# Patient Record
Sex: FEMALE | Race: WHITE | NOT HISPANIC OR LATINO | Employment: FULL TIME | URBAN - METROPOLITAN AREA
[De-identification: names, ages, dates, MRNs, and addresses within clinical notes are randomized per-mention and may not be internally consistent; named-entity substitution may affect disease eponyms.]

---

## 2018-11-28 ENCOUNTER — TELEPHONE (OUTPATIENT)
Dept: PHARMACY | Facility: CLINIC | Age: 63
End: 2018-11-28

## 2018-11-28 NOTE — TELEPHONE ENCOUNTER
Prior authorization for Repatha is already on file.    Approval dates:  11/13/2018 - 11/12/2019    Patient co-pay:  $250.00    Repatha co-pay card on file which reduces co-pay to $5.00.

## 2018-12-06 NOTE — TELEPHONE ENCOUNTER
Initial Repatha 140mg/mL Sureclick PENS consultation attempted.  Patient had asked to call her at work for 3pm - she was gone for the day.  Home # tried.  No answer.  No option to [facility] for call back.  $5 copay in 004.

## 2018-12-07 NOTE — TELEPHONE ENCOUNTER
"Incoming call for Repatha. Confirmed two patient identifiers - name + . Pt states that she was on Repatha "years ago" but due to insurance coverage issues, she has been off of it for quite some time. She declines consultation and injection training, but she understands she can call [facility] if she has further questions or concerns. She confirms dosing of Q14D. She asks [facility] to ship Repatha on 12/10/18 to arrive at her home on 18 via [employer]. $5 copay (CCOF), Address confirmed - NO signature requirement requested, NOTE to [employer]: put on handicap ramp. Patient will restart Repatha on 18 when received. [facility] will f/u a week after and then monthly for refills. TTN  "

## 2018-12-26 NOTE — TELEPHONE ENCOUNTER
2nd attempt for Repatha F/U.  No answer.   No option to [facility] for call back.

## 2019-01-04 ENCOUNTER — TELEPHONE (OUTPATIENT)
Dept: PHARMACY | Facility: CLINIC | Age: 64
End: 2019-01-04

## 2019-01-04 NOTE — TELEPHONE ENCOUNTER
Repatha Sureclick follow up refill confirmed. We will ship Repatha refill on 19 via fedex to arrive on 19. $5.00 copay- 004. Confirmed 2 patient identifiers - name and .      Start date confirmed 18.  Patient self injects Repatha in her stomach without any difficulties. Confirms rotating injection site and storing med in the fridge.  She has zero doses on hand, next injection due 19.  Patient reports no side effects since beginning of therapy.  No missed doses, no new medications, no new allergies or health conditions reported at this time. All questions answered and addressed to patients satisfaction. Pt verbalized understanding.

## 2019-01-29 NOTE — TELEPHONE ENCOUNTER
Patient called to refill Repatha.  Patient confirmed she is due for next injection 2/5/19.  Ship 1/30/19 for 1/31/19 delivery.  Verified address.  Copay $5.00 in 004.  CCOF.  Patient confirmed no new medications, health conditions, or allergies.  Declined [facility] .

## 2019-02-20 ENCOUNTER — TELEPHONE (OUTPATIENT)
Dept: PHARMACY | Facility: CLINIC | Age: 64
End: 2019-02-20

## 2019-02-20 NOTE — TELEPHONE ENCOUNTER
Repatha refill. $5 (004) - [facility]. Address confirmed. Shipping out 2/25/19. Dose due: 3/5/19. No new supplies needed. Reviewed sharps disposal.

## 2019-03-21 ENCOUNTER — TELEPHONE (OUTPATIENT)
Dept: PHARMACY | Facility: CLINIC | Age: 64
End: 2019-03-21

## 2019-04-18 ENCOUNTER — TELEPHONE (OUTPATIENT)
Dept: PHARMACY | Facility: CLINIC | Age: 64
End: 2019-04-18

## 2019-04-18 NOTE — TELEPHONE ENCOUNTER
The patient contacted [facility] to refill her Repatha. She requested it to be shipped on 4/29 since she will be [place] of [place] all next week. Next injection is due 4/30. Informed the patient we cannot set up shipments that far in advance. She is agreeable for us to call her on 4/22 to set up refill for 4/29.

## 2019-04-22 NOTE — TELEPHONE ENCOUNTER
LVM for patient in regards to Repatha refill. She previously requested a shipment on 4/29 - will continue to attempt to reach her. Will move clinical follow up to next refill date since patient is currently on vacation.

## 2019-05-20 ENCOUNTER — TELEPHONE (OUTPATIENT)
Dept: PHARMACY | Facility: CLINIC | Age: 64
End: 2019-05-20

## 2019-06-13 ENCOUNTER — TELEPHONE (OUTPATIENT)
Dept: PHARMACY | Facility: CLINIC | Age: 64
End: 2019-06-13

## 2019-07-15 ENCOUNTER — TELEPHONE (OUTPATIENT)
Dept: PHARMACY | Facility: CLINIC | Age: 64
End: 2019-07-15

## 2019-08-13 ENCOUNTER — TELEPHONE (OUTPATIENT)
Dept: PHARMACY | Facility: CLINIC | Age: 64
End: 2019-08-13

## 2019-09-11 ENCOUNTER — TELEPHONE (OUTPATIENT)
Dept: PHARMACY | Facility: CLINIC | Age: 64
End: 2019-09-11

## 2019-09-11 NOTE — TELEPHONE ENCOUNTER
Patient returned phone call back regarding specialty medication refill for Repatha $5.00/004- Patient scheduled to have medication shipped out on 9/16/19 to receive on 9/17/19 address confirmed. Patient informed no new medications, conditions or allergies since last talked to [facility]. Patient has 0 days remaining & no missed doses. Patient declines questions for the clinical pharmacist. Patient voiced understanding. ABRAHAM

## 2019-10-09 ENCOUNTER — TELEPHONE (OUTPATIENT)
Dept: PHARMACY | Facility: CLINIC | Age: 64
End: 2019-10-09

## 2019-10-09 NOTE — TELEPHONE ENCOUNTER
Repatha refill and follow up. Confirmed two patient identifiers - name + . She denies any side effects, difficulties with injections, missed doses or changes to allergies, health conditions, medications or insurance. She sees an outside provider and did not wish to complete full f/u. She vaguely says that nothing has changed and confirms that she is doing well on treatment. She mentions LDL being about 70mg/dL last time discussed with provider, which is very satisfied with results. She has 0 doses on hand and will need her next dose for 10/15/19. [facility] to ship out Repatha on 10/14/19 to arrive at her home on 10/15/19 via [employer] to take when received. Address confirmed, $5 copay (CC info updated), no additional supplies needed at this time. All questions answered to patient's satisfaction, [facility] will continue to reach out to patient monthly for refills and annually for follow up. ROBERTO

## 2019-11-06 ENCOUNTER — TELEPHONE (OUTPATIENT)
Dept: PHARMACY | Facility: CLINIC | Age: 64
End: 2019-11-06

## 2019-12-05 ENCOUNTER — TELEPHONE (OUTPATIENT)
Dept: PHARMACY | Facility: CLINIC | Age: 64
End: 2019-12-05

## 2019-12-30 ENCOUNTER — TELEPHONE (OUTPATIENT)
Dept: PHARMACY | Facility: CLINIC | Age: 64
End: 2019-12-30

## 2020-02-04 ENCOUNTER — TELEPHONE (OUTPATIENT)
Dept: PHARMACY | Facility: CLINIC | Age: 65
End: 2020-02-04

## 2020-02-05 NOTE — TELEPHONE ENCOUNTER
Rx call for Repatha refill pt reached shipping  with  arrival copay 5.00 CCOF: 4698 with pt consent @004. Added sharps flag to order upon pt request. next inj is . Address and  confirmed. Patient has 0 doses on hand at this time. Patient has not started any new medications, has had no missed doses and no side effects present. Patient is currently taking the medication as directed by doctors instruction Inject 140mg (1 pen) into the skin every 2 weeks . Patient does have a safe place in their residence to keep medication at desired temperature away from small children and pets. Patient also does have the capability of contacting 911 in the event of an emergency. Patient states they do not have any questions or concerns at this time.

## 2020-03-25 ENCOUNTER — TELEPHONE (OUTPATIENT)
Dept: PHARMACY | Facility: CLINIC | Age: 65
End: 2020-03-25

## 2020-03-25 NOTE — TELEPHONE ENCOUNTER
Pt contacted [facility] regarding Repatha refill on 3/25. Medication will ship  and arrive 4/3 with pt consent. Copay $5.00 [facility] (3295) @004. Address and  confirmed. Patient has 0 doses on hand at this time, next injection due . Patient has not started any new medications, has had no missed doses and no side effects present. Patient is currently taking the medication as directed by doctors instruction. Patient states they do not have any questions or concerns at this time.

## 2020-04-21 DIAGNOSIS — Z01.84 ANTIBODY RESPONSE EXAMINATION: ICD-10-CM

## 2020-04-29 ENCOUNTER — TELEPHONE (OUTPATIENT)
Dept: PHARMACY | Facility: CLINIC | Age: 65
End: 2020-04-29

## 2020-05-21 DIAGNOSIS — Z01.84 ANTIBODY RESPONSE EXAMINATION: ICD-10-CM

## 2020-06-02 ENCOUNTER — TELEPHONE (OUTPATIENT)
Dept: PHARMACY | Facility: CLINIC | Age: 65
End: 2020-06-02

## 2020-06-02 NOTE — TELEPHONE ENCOUNTER
Refill call regarding repatha at [facility]. Copay $5. Patient is in need of a refill, will ship  to arrive  with patient consent. Patient has not started any new medications including OTC drugs. Patient has not had any medication/ dose or instruction changes. No new allergies or side effects reported with this shipment. Medication is being taken as prescribed by physician and properly stored. Two patient identifiers:  and Address verified. Patient has no questions or concerns for RPh. No sharps needed. Next injection due .

## 2020-06-20 DIAGNOSIS — Z01.84 ANTIBODY RESPONSE EXAMINATION: ICD-10-CM

## 2020-06-30 ENCOUNTER — TELEPHONE (OUTPATIENT)
Dept: PHARMACY | Facility: CLINIC | Age: 65
End: 2020-06-30

## 2020-07-08 ENCOUNTER — TELEPHONE (OUTPATIENT)
Dept: PHARMACY | Facility: CLINIC | Age: 65
End: 2020-07-08

## 2020-07-20 DIAGNOSIS — Z01.84 ANTIBODY RESPONSE EXAMINATION: ICD-10-CM

## 2020-08-10 ENCOUNTER — TELEPHONE (OUTPATIENT)
Dept: PHARMACY | Facility: CLINIC | Age: 65
End: 2020-08-10

## 2020-08-19 DIAGNOSIS — Z01.84 ANTIBODY RESPONSE EXAMINATION: ICD-10-CM

## 2020-09-18 DIAGNOSIS — Z01.84 ANTIBODY RESPONSE EXAMINATION: ICD-10-CM

## 2020-10-09 ENCOUNTER — TELEPHONE (OUTPATIENT)
Dept: PHARMACY | Facility: CLINIC | Age: 65
End: 2020-10-09

## 2020-10-18 DIAGNOSIS — Z01.84 ANTIBODY RESPONSE EXAMINATION: ICD-10-CM

## 2020-11-07 ENCOUNTER — SPECIALTY PHARMACY (OUTPATIENT)
Dept: PHARMACY | Facility: CLINIC | Age: 65
End: 2020-11-07

## 2020-11-07 NOTE — TELEPHONE ENCOUNTER
Specialty Pharmacy - Refill Coordination    11/7 confirmed shipment on 11/9 to arrive on 11/10 for injection on 11/10 [facility]        Current Outpatient Medications   Medication Sig    ascorbic acid (VITAMIN C) 1000 MG tablet Take 1,000 mg by mouth once daily.    aspirin 81 MG Chew Take 81 mg by mouth once daily.    azithromycin (Z-SONJA) 250 MG tablet Take 2 tablets (500 mg total) by mouth once daily.    b complex vitamins capsule Take 1 capsule by mouth once daily.    carvedilol (COREG) 6.25 MG tablet Take 6.25 mg by mouth 2 (two) times daily with meals.    citalopram (CELEXA) 20 MG tablet Take 20 mg by mouth once daily.    coenzyme Q10 (CO Q-10) 100 mg capsule Take 100 mg by mouth once daily.    evolocumab (REPATHA SURECLICK) 140 mg/mL PnIj Inject 140mg (1 pen) into the skin every 2 weeks    evolocumab (REPATHA SURECLICK) 140 mg/mL PnIj Inject 140mg (1 pen) into the skin every 2 weeks    niacin (NIASPAN EXTENDED-RELEASE) 500 mg tablet Take 500 mg by mouth once daily.    quinapril (ACCUPRIL) 5 MG tablet Take 5 mg by mouth every evening.    ramelteon (ROZEREM) 8 mg tablet Take 8 mg by mouth once daily.    tizanidine (ZANAFLEX) 4 MG tablet Take 4 mg by mouth nightly.    vitamin D 1000 units Tab Take 185 mg by mouth once daily.   Last reviewed on 8/5/2015  9:10 AM by Jaime Cornell MD    Review of patient's allergies indicates:   Allergen Reactions    Ambien [zolpidem] Other (See Comments)    Sudafed [pseudoephedrine hcl] Other (See Comments)    Last reviewed on  11/15/2017 4:02 PM by Ramana Olguin      Tasks added this encounter   No tasks added.   Tasks due within next 3 months   No tasks due.     Krupa Ardon  [facility]  [address] [place] [address]  Phone: [phone]  Fax: [phone]

## 2020-11-17 DIAGNOSIS — Z01.84 ANTIBODY RESPONSE EXAMINATION: ICD-10-CM

## 2020-12-02 ENCOUNTER — SPECIALTY PHARMACY (OUTPATIENT)
Dept: PHARMACY | Facility: CLINIC | Age: 65
End: 2020-12-02

## 2020-12-03 NOTE — TELEPHONE ENCOUNTER
Specialty Pharmacy - Refill Coordination    Specialty Medication Orders Linked to Encounter      Most Recent Value   Medication #1  evolocumab (REPATHA SURECLICK) 140 mg/mL PnIj (Order#[id number], Rx#[id number])          Refill Questions - Documented Responses      Most Recent Value   Relationship to patient of person spoken to?  Self   HIPAA/medical authority confirmed?  Yes   How will the patient receive the medication?  Mail   When does the patient need to receive the medication?  12/08/20   Shipping Address  Home   Address in [facility] confirmed and updated if neccessary?  Yes   Expected Copay ($)  5   Is the patient able to afford the medication copay?  Yes   Payment Method  CC on file   Days supply of Refill  28   Would patient like to speak to a pharmacist?  No   Do you want to trigger an intervention?  No   Do you want to trigger an additional referral task?  No   Refill activity completed?  Yes   Refill activity plan  Refill scheduled   Shipment/Pickup Date:  12/03/20          Current Outpatient Medications   Medication Sig    ascorbic acid (VITAMIN C) 1000 MG tablet Take 1,000 mg by mouth once daily.    aspirin 81 MG Chew Take 81 mg by mouth once daily.    azithromycin (Z-SONJA) 250 MG tablet Take 2 tablets (500 mg total) by mouth once daily.    b complex vitamins capsule Take 1 capsule by mouth once daily.    carvedilol (COREG) 6.25 MG tablet Take 6.25 mg by mouth 2 (two) times daily with meals.    citalopram (CELEXA) 20 MG tablet Take 20 mg by mouth once daily.    coenzyme Q10 (CO Q-10) 100 mg capsule Take 100 mg by mouth once daily.    evolocumab (REPATHA SURECLICK) 140 mg/mL PnIj Inject 140mg (1 pen) into the skin every 2 weeks    niacin (NIASPAN EXTENDED-RELEASE) 500 mg tablet Take 500 mg by mouth once daily.    quinapril (ACCUPRIL) 5 MG tablet Take 5 mg by mouth every evening.    ramelteon (ROZEREM) 8 mg tablet Take 8 mg by mouth once daily.    tizanidine (ZANAFLEX) 4 MG tablet Take  4 mg by mouth nightly.    vitamin D 1000 units Tab Take 185 mg by mouth once daily.   Last reviewed on 8/5/2015  9:10 AM by Jaime Cornell MD    Review of patient's allergies indicates:   Allergen Reactions    Ambien [zolpidem] Other (See Comments)    Sudafed [pseudoephedrine hcl] Other (See Comments)    Last reviewed on  11/15/2017 4:02 PM by Ramana Olguin      Tasks added this encounter   No tasks added.   Tasks due within next 3 months   12/1/2020 - Refill Call     Beth Morris  [facility]  [address] [place] [id number]  Phone: [phone]  Fax: [phone]

## 2020-12-23 ENCOUNTER — SPECIALTY PHARMACY (OUTPATIENT)
Dept: PHARMACY | Facility: CLINIC | Age: 65
End: 2020-12-23

## 2020-12-23 NOTE — TELEPHONE ENCOUNTER
Specialty Pharmacy - Refill Coordination    Specialty Medication Orders Linked to Encounter      Most Recent Value   Medication #1  evolocumab (REPATHA SURECLICK) 140 mg/mL PnIj (Order#[id number], Rx#[id number])          Refill Questions - Documented Responses      Most Recent Value   Relationship to patient of person spoken to?  Self   HIPAA/medical authority confirmed?  Yes   Any changes in contact preferences or allowed representatives?  No   Has the patient had any insurance changes?  No   Has the patient had any changes to specialty medication, dose, or instructions?  No   Can the patient store medication/sharps container properly (at the correct temperature, away from children/pets, etc.)?  Yes   Can the patient call emergency services (911) in the event of an emergency?  Yes   Does the patient have any concerns or questions about taking or administering this medication as prescribed?  No   How many doses did the patient miss in the past 4 weeks or since the last fill?  0   How many doses does the patient have on hand?  0   How many days does the patient report on hand quantity will last?  13   Does the number of doses/days supply remaining match pharmacy expected amounts?  Yes   Does the patient feel that this medication is effective?  Yes   During the past 4 weeks, has patient missed any activities due to condition or medication?  No   During the past 4 weeks, did patient have any of the following urgent care visits?  None   How will the patient receive the medication?  Mail   When does the patient need to receive the medication?  01/04/21   Shipping Address  Home   Address in [facility] confirmed and updated if neccessary?  Yes   Expected Copay ($)  5   Is the patient able to afford the medication copay?  Yes   Payment Method  CC on file   Days supply of Refill  28   Would patient like to speak to a pharmacist?  No   Do you want to trigger an intervention?  No   Do you want to trigger an additional  referral task?  No   Refill activity completed?  Yes   Refill activity plan  Refill scheduled   Shipment/Pickup Date:  12/29/20          Current Outpatient Medications   Medication Sig    ascorbic acid (VITAMIN C) 1000 MG tablet Take 1,000 mg by mouth once daily.    aspirin 81 MG Chew Take 81 mg by mouth once daily.    azithromycin (Z-SONJA) 250 MG tablet Take 2 tablets (500 mg total) by mouth once daily.    b complex vitamins capsule Take 1 capsule by mouth once daily.    carvedilol (COREG) 6.25 MG tablet Take 6.25 mg by mouth 2 (two) times daily with meals.    citalopram (CELEXA) 20 MG tablet Take 20 mg by mouth once daily.    coenzyme Q10 (CO Q-10) 100 mg capsule Take 100 mg by mouth once daily.    evolocumab (REPATHA SURECLICK) 140 mg/mL PnIj Inject 140mg (1 pen) into the skin every 2 weeks    niacin (NIASPAN EXTENDED-RELEASE) 500 mg tablet Take 500 mg by mouth once daily.    quinapril (ACCUPRIL) 5 MG tablet Take 5 mg by mouth every evening.    ramelteon (ROZEREM) 8 mg tablet Take 8 mg by mouth once daily.    tizanidine (ZANAFLEX) 4 MG tablet Take 4 mg by mouth nightly.    vitamin D 1000 units Tab Take 185 mg by mouth once daily.   Last reviewed on 8/5/2015  9:10 AM by Jaime Cornell MD    Review of patient's allergies indicates:   Allergen Reactions    Ambien [zolpidem] Other (See Comments)    Sudafed [pseudoephedrine hcl] Other (See Comments)    Last reviewed on  11/15/2017 4:02 PM by Ramana Olguin      Tasks added this encounter   No tasks added.   Tasks due within next 3 months   12/25/2020 - Refill Call (Auto Added)     NILS HENLEY  [facility] - [facility]  [address] [place] [address]  Phone: [phone]  Fax: [phone]

## 2021-01-28 ENCOUNTER — SPECIALTY PHARMACY (OUTPATIENT)
Dept: PHARMACY | Facility: CLINIC | Age: 66
End: 2021-01-28

## 2021-02-25 ENCOUNTER — SPECIALTY PHARMACY (OUTPATIENT)
Dept: PHARMACY | Facility: CLINIC | Age: 66
End: 2021-02-25

## 2021-03-30 ENCOUNTER — SPECIALTY PHARMACY (OUTPATIENT)
Dept: PHARMACY | Facility: CLINIC | Age: 66
End: 2021-03-30

## 2021-04-26 ENCOUNTER — TELEPHONE (OUTPATIENT)
Dept: PHARMACY | Facility: CLINIC | Age: 66
End: 2021-04-26

## 2021-04-27 ENCOUNTER — SPECIALTY PHARMACY (OUTPATIENT)
Dept: PHARMACY | Facility: CLINIC | Age: 66
End: 2021-04-27

## 2021-05-06 ENCOUNTER — SPECIALTY PHARMACY (OUTPATIENT)
Dept: PHARMACY | Facility: CLINIC | Age: 66
End: 2021-05-06

## 2021-05-29 ENCOUNTER — SPECIALTY PHARMACY (OUTPATIENT)
Dept: PHARMACY | Facility: CLINIC | Age: 66
End: 2021-05-29

## 2021-06-28 ENCOUNTER — SPECIALTY PHARMACY (OUTPATIENT)
Dept: PHARMACY | Facility: CLINIC | Age: 66
End: 2021-06-28

## 2021-06-30 ENCOUNTER — SPECIALTY PHARMACY (OUTPATIENT)
Dept: PHARMACY | Facility: CLINIC | Age: 66
End: 2021-06-30

## 2021-07-01 ENCOUNTER — SPECIALTY PHARMACY (OUTPATIENT)
Dept: PHARMACY | Facility: CLINIC | Age: 66
End: 2021-07-01

## 2021-07-22 ENCOUNTER — SPECIALTY PHARMACY (OUTPATIENT)
Dept: PHARMACY | Facility: CLINIC | Age: 66
End: 2021-07-22

## 2021-08-24 ENCOUNTER — SPECIALTY PHARMACY (OUTPATIENT)
Dept: PHARMACY | Facility: CLINIC | Age: 66
End: 2021-08-24

## 2021-09-21 ENCOUNTER — SPECIALTY PHARMACY (OUTPATIENT)
Dept: PHARMACY | Facility: CLINIC | Age: 66
End: 2021-09-21

## 2021-09-27 ENCOUNTER — IMMUNIZATION (OUTPATIENT)
Dept: PRIMARY CARE CLINIC | Facility: CLINIC | Age: 66
End: 2021-09-27

## 2021-09-27 DIAGNOSIS — Z23 NEED FOR VACCINATION: Primary | ICD-10-CM

## 2021-09-27 PROCEDURE — 91300 COVID-19, MRNA, LNP-S, PF, 30 MCG/0.3 ML DOSE VACCINE: CPT | Mod: PBBFAC | Performed by: INTERNAL MEDICINE

## 2021-09-27 PROCEDURE — 0003A COVID-19, MRNA, LNP-S, PF, 30 MCG/0.3 ML DOSE VACCINE: CPT | Mod: CV19,PBBFAC | Performed by: INTERNAL MEDICINE

## 2021-10-19 ENCOUNTER — SPECIALTY PHARMACY (OUTPATIENT)
Dept: PHARMACY | Facility: CLINIC | Age: 66
End: 2021-10-19

## 2021-10-19 ENCOUNTER — PATIENT MESSAGE (OUTPATIENT)
Dept: PHARMACY | Facility: CLINIC | Age: 66
End: 2021-10-19

## 2021-11-17 ENCOUNTER — SPECIALTY PHARMACY (OUTPATIENT)
Dept: PHARMACY | Facility: CLINIC | Age: 66
End: 2021-11-17

## 2021-12-21 ENCOUNTER — SPECIALTY PHARMACY (OUTPATIENT)
Dept: PHARMACY | Facility: CLINIC | Age: 66
End: 2021-12-21

## 2022-01-19 ENCOUNTER — PATIENT MESSAGE (OUTPATIENT)
Dept: PHARMACY | Facility: CLINIC | Age: 67
End: 2022-01-19

## 2022-01-19 ENCOUNTER — SPECIALTY PHARMACY (OUTPATIENT)
Dept: PHARMACY | Facility: CLINIC | Age: 67
End: 2022-01-19

## 2022-01-19 NOTE — TELEPHONE ENCOUNTER
Specialty Pharmacy - Refill Coordination    Specialty Medication Orders Linked to Encounter    Flowsheet Row Most Recent Value   Medication #1 alirocumab (PRALUENT PEN) 75 mg/mL PnIj (Order#[id number], Rx#[id number])          Refill Questions - Documented Responses    Flowsheet Row Most Recent Value   Patient Availability and HIPAA Verification    Does patient want to proceed with activity? Yes   HIPAA/medical authority confirmed? Yes   Relationship to patient of person spoken to? Self   Refill Screening Questions    Would patient like to speak to a pharmacist? No   When does the patient need to receive the medication? 01/25/22   Refill Delivery Questions    How will the patient receive the medication? Delivery Tessie   When does the patient need to receive the medication? 01/25/22   Shipping Address Home   Address in [facility] confirmed and updated if neccessary? Yes   Expected Copay ($) 0   Is the patient able to afford the medication copay? Yes   Payment Method zero copay   Days supply of Refill 28   Supplies needed? No supplies needed   Refill activity completed? Yes   Refill activity plan Refill scheduled   Shipment/Pickup Date: 01/20/22          Current Outpatient Medications   Medication Sig    alirocumab (PRALUENT PEN) 75 mg/mL PnIj Inject 75 mg into the skin every 2 weeks    ascorbic acid (VITAMIN C) 1000 MG tablet Take 1,000 mg by mouth once daily.    aspirin 81 MG Chew Take 81 mg by mouth once daily.    azithromycin (Z-SONJA) 250 MG tablet Take 2 tablets (500 mg total) by mouth once daily.    b complex vitamins capsule Take 1 capsule by mouth once daily.    carvedilol (COREG) 6.25 MG tablet Take 6.25 mg by mouth 2 (two) times daily with meals.    citalopram (CELEXA) 20 MG tablet Take 20 mg by mouth once daily.    coenzyme Q10 (CO Q-10) 100 mg capsule Take 100 mg by mouth once daily.    evolocumab (REPATHA SURECLICK) 140 mg/mL PnIj Inject 140mg (1 pen) into the skin every 2 weeks    niacin  (NIASPAN EXTENDED-RELEASE) 500 mg tablet Take 500 mg by mouth once daily.    quinapril (ACCUPRIL) 5 MG tablet Take 5 mg by mouth every evening.    ramelteon (ROZEREM) 8 mg tablet Take 8 mg by mouth once daily.    tizanidine (ZANAFLEX) 4 MG tablet Take 4 mg by mouth nightly.    vitamin D 1000 units Tab Take 185 mg by mouth once daily.   Last reviewed on 8/5/2015  9:10 AM by Jaime Cornell MD    Review of patient's allergies indicates:   Allergen Reactions    Ambien [zolpidem] Other (See Comments)    Sudafed [pseudoephedrine hcl] Other (See Comments)    Last reviewed on  11/15/2017 4:02 PM by Ramana Olguin      Tasks added this encounter   2/15/2022 - Refill Call (Auto Added)   Tasks due within next 3 months   No tasks due.     Charlene Michele, PharmD  [facility]  [address]  [facility] [address]  Phone: [phone]  Fax: [phone]

## 2022-02-15 ENCOUNTER — SPECIALTY PHARMACY (OUTPATIENT)
Dept: PHARMACY | Facility: CLINIC | Age: 67
End: 2022-02-15

## 2022-02-15 NOTE — TELEPHONE ENCOUNTER
Specialty Pharmacy - Refill Coordination    Specialty Medication Orders Linked to Encounter    Flowsheet Row Most Recent Value   Medication #1 alirocumab (PRALUENT PEN) 75 mg/mL PnIj (Order#[id number], Rx#[id number])          Refill Questions - Documented Responses    Flowsheet Row Most Recent Value   Patient Availability and HIPAA Verification    Does patient want to proceed with activity? Yes   HIPAA/medical authority confirmed? Yes   Relationship to patient of person spoken to? Self   Refill Screening Questions    When does the patient need to receive the medication? 02/22/22   Refill Delivery Questions    How will the patient receive the medication? Delivery Tessie   When does the patient need to receive the medication? 02/22/22   Shipping Address Home   Expected Copay ($) 0   Is the patient able to afford the medication copay? Yes   Payment Method zero copay   Days supply of Refill 28   Supplies needed? [employer]   Refill activity completed? Yes   Refill activity plan Refill scheduled   Shipment/Pickup Date: 02/18/22          Current Outpatient Medications   Medication Sig    alirocumab (PRALUENT PEN) 75 mg/mL PnIj Inject 75 mg into the skin every 2 weeks    ascorbic acid (VITAMIN C) 1000 MG tablet Take 1,000 mg by mouth once daily.    aspirin 81 MG Chew Take 81 mg by mouth once daily.    azithromycin (Z-SONJA) 250 MG tablet Take 2 tablets (500 mg total) by mouth once daily.    b complex vitamins capsule Take 1 capsule by mouth once daily.    carvedilol (COREG) 6.25 MG tablet Take 6.25 mg by mouth 2 (two) times daily with meals.    citalopram (CELEXA) 20 MG tablet Take 20 mg by mouth once daily.    coenzyme Q10 (CO Q-10) 100 mg capsule Take 100 mg by mouth once daily.    evolocumab (REPATHA SURECLICK) 140 mg/mL PnIj Inject 140mg (1 pen) into the skin every 2 weeks    niacin (NIASPAN EXTENDED-RELEASE) 500 mg tablet Take 500 mg by mouth once daily.    quinapril (ACCUPRIL) 5 MG tablet Take 5 mg by  mouth every evening.    ramelteon (ROZEREM) 8 mg tablet Take 8 mg by mouth once daily.    tizanidine (ZANAFLEX) 4 MG tablet Take 4 mg by mouth nightly.    vitamin D 1000 units Tab Take 185 mg by mouth once daily.   Last reviewed on 8/5/2015  9:10 AM by Jaime Conrell MD    Review of patient's allergies indicates:   Allergen Reactions    Ambien [zolpidem] Other (See Comments)    Sudafed [pseudoephedrine hcl] Other (See Comments)    Last reviewed on  11/15/2017 4:02 PM by Ramana Olguin      Tasks added this encounter   No tasks added.   Tasks due within next 3 months   2/15/2022 - Refill Call (Auto Added)     Brooklynn Price, PharmD  [facility]  [address] [place] [id number]  Phone: [phone]  Fax: [phone]

## 2022-03-15 ENCOUNTER — SPECIALTY PHARMACY (OUTPATIENT)
Dept: PHARMACY | Facility: CLINIC | Age: 67
End: 2022-03-15

## 2022-03-15 NOTE — TELEPHONE ENCOUNTER
Specialty Pharmacy - Refill Coordination    Specialty Medication Orders Linked to Encounter    Flowsheet Row Most Recent Value   Medication #1 alirocumab (PRALUENT PEN) 75 mg/mL PnIj (Order#[id number], Rx#[id number])          Refill Questions - Documented Responses    Flowsheet Row Most Recent Value   Patient Availability and HIPAA Verification    Does patient want to proceed with activity? Yes   HIPAA/medical authority confirmed? Yes   Relationship to patient of person spoken to? Self   Refill Screening Questions    Would patient like to speak to a pharmacist? No   When does the patient need to receive the medication? 03/22/22   Refill Delivery Questions    How will the patient receive the medication? Delivery Tessie   When does the patient need to receive the medication? 03/22/22   Shipping Address Home   Address in [facility] confirmed and updated if neccessary? Yes   Expected Copay ($) 0   Is the patient able to afford the medication copay? Yes   Payment Method zero copay   Days supply of Refill 28   Refill activity completed? Yes   Refill activity plan Refill scheduled   Shipment/Pickup Date: 03/18/22          Current Outpatient Medications   Medication Sig    alirocumab (PRALUENT PEN) 75 mg/mL PnIj Inject 75 mg into the skin every 2 weeks    ascorbic acid (VITAMIN C) 1000 MG tablet Take 1,000 mg by mouth once daily.    aspirin 81 MG Chew Take 81 mg by mouth once daily.    azithromycin (Z-SONJA) 250 MG tablet Take 2 tablets (500 mg total) by mouth once daily.    b complex vitamins capsule Take 1 capsule by mouth once daily.    carvedilol (COREG) 6.25 MG tablet Take 6.25 mg by mouth 2 (two) times daily with meals.    citalopram (CELEXA) 20 MG tablet Take 20 mg by mouth once daily.    coenzyme Q10 (CO Q-10) 100 mg capsule Take 100 mg by mouth once daily.    evolocumab (REPATHA SURECLICK) 140 mg/mL PnIj Inject 140mg (1 pen) into the skin every 2 weeks    niacin (NIASPAN EXTENDED-RELEASE) 500 mg tablet  Take 500 mg by mouth once daily.    quinapril (ACCUPRIL) 5 MG tablet Take 5 mg by mouth every evening.    ramelteon (ROZEREM) 8 mg tablet Take 8 mg by mouth once daily.    tizanidine (ZANAFLEX) 4 MG tablet Take 4 mg by mouth nightly.    vitamin D 1000 units Tab Take 185 mg by mouth once daily.   Last reviewed on 8/5/2015  9:10 AM by Jaime Cornell MD    Review of patient's allergies indicates:   Allergen Reactions    Ambien [zolpidem] Other (See Comments)    Sudafed [pseudoephedrine hcl] Other (See Comments)    Last reviewed on  11/15/2017 4:02 PM by Ramana Olguin      Tasks added this encounter   4/12/2022 - Refill Call (Auto Added)   Tasks due within next 3 months   No tasks due.     Phill Neves [facility]  [address] [facility] [address]  Phone: [phone]  Fax: [phone]

## 2022-04-12 ENCOUNTER — SPECIALTY PHARMACY (OUTPATIENT)
Dept: PHARMACY | Facility: CLINIC | Age: 67
End: 2022-04-12

## 2022-04-12 NOTE — TELEPHONE ENCOUNTER
Outgoing call attempt, left patient voicemail.    Need HH and income to renew Jerzy for Praluent.    Will follow up 04/13/22.

## 2022-04-13 NOTE — TELEPHONE ENCOUNTER
Specialty Pharmacy - Refill Coordination    Specialty Medication Orders Linked to Encounter    Flowsheet Row Most Recent Value   Medication #1 alirocumab (PRALUENT PEN) 75 mg/mL PnIj (Order#[id number], Rx#[id number])          Refill Questions - Documented Responses    Flowsheet Row Most Recent Value   Patient Availability and HIPAA Verification    Does patient want to proceed with activity? Yes   HIPAA/medical authority confirmed? Yes   Relationship to patient of person spoken to? Self   Refill Screening Questions    Would patient like to speak to a pharmacist? No   When does the patient need to receive the medication? 04/19/22   Refill Delivery Questions    How will the patient receive the medication? Delivery Tessie   When does the patient need to receive the medication? 04/19/22   Shipping Address Home   Address in [facility] confirmed and updated if neccessary? Yes   Is the patient able to afford the medication copay? Yes   Payment Method zero copay   Days supply of Refill 28   Supplies needed? [employer]   Refill activity completed? Yes   Refill activity plan Refill scheduled   Shipment/Pickup Date: 04/14/22          Current Outpatient Medications   Medication Sig    alirocumab (PRALUENT PEN) 75 mg/mL PnIj Inject 75 mg into the skin every 2 weeks    ascorbic acid (VITAMIN C) 1000 MG tablet Take 1,000 mg by mouth once daily.    aspirin 81 MG Chew Take 81 mg by mouth once daily.    azithromycin (Z-SONJA) 250 MG tablet Take 2 tablets (500 mg total) by mouth once daily.    b complex vitamins capsule Take 1 capsule by mouth once daily.    carvedilol (COREG) 6.25 MG tablet Take 6.25 mg by mouth 2 (two) times daily with meals.    citalopram (CELEXA) 20 MG tablet Take 20 mg by mouth once daily.    coenzyme Q10 (CO Q-10) 100 mg capsule Take 100 mg by mouth once daily.    evolocumab (REPATHA SURECLICK) 140 mg/mL PnIj Inject 140mg (1 pen) into the skin every 2 weeks    niacin (NIASPAN EXTENDED-RELEASE)  500 mg tablet Take 500 mg by mouth once daily.    quinapril (ACCUPRIL) 5 MG tablet Take 5 mg by mouth every evening.    ramelteon (ROZEREM) 8 mg tablet Take 8 mg by mouth once daily.    tizanidine (ZANAFLEX) 4 MG tablet Take 4 mg by mouth nightly.    vitamin D 1000 units Tab Take 185 mg by mouth once daily.   Last reviewed on 8/5/2015  9:10 AM by Jaime Cornell MD    Review of patient's allergies indicates:   Allergen Reactions    Ambien [zolpidem] Other (See Comments)    Sudafed [pseudoephedrine hcl] Other (See Comments)    Last reviewed on  11/15/2017 4:02 PM by Ramana Olguin      Tasks added this encounter   5/10/2022 - Refill Call (Auto Added)   Tasks due within next 3 months   No tasks due.     Zoila Tierney, PharmD  [facility]  [address] [place] [address]  Phone: [phone]  Fax: [phone]

## 2022-04-13 NOTE — TELEPHONE ENCOUNTER
FOR DOCUMENTATION ONLY:  Financial Assistance for Praluent approved from 04/13/22 to 032/28/22  [facility]  BIN: [id number]  PCN:  [id number]  Id: [id number]  GRP: [id number]    Test claim 0

## 2022-04-18 ENCOUNTER — PATIENT MESSAGE (OUTPATIENT)
Dept: ADMINISTRATIVE | Facility: OTHER | Age: 67
End: 2022-04-18

## 2022-05-10 ENCOUNTER — SPECIALTY PHARMACY (OUTPATIENT)
Dept: PHARMACY | Facility: CLINIC | Age: 67
End: 2022-05-10

## 2022-05-10 NOTE — TELEPHONE ENCOUNTER
Specialty Pharmacy - Refill Coordination    Specialty Medication Orders Linked to Encounter    Flowsheet Row Most Recent Value   Medication #1 alirocumab (PRALUENT PEN) 75 mg/mL PnIj (Order#[id number], Rx#[id number])          Refill Questions - Documented Responses    Flowsheet Row Most Recent Value   Patient Availability and HIPAA Verification    Does patient want to proceed with activity? Yes   HIPAA/medical authority confirmed? Yes   Relationship to patient of person spoken to? Self   Refill Screening Questions    Would patient like to speak to a pharmacist? No   When does the patient need to receive the medication? 05/17/22   Refill Delivery Questions    How will the patient receive the medication? Delivery Tessie   When does the patient need to receive the medication? 05/17/22   Shipping Address Home   Expected Copay ($) 0   Is the patient able to afford the medication copay? Yes   Payment Method zero copay   Days supply of Refill 28   Supplies needed? No supplies needed   Refill activity completed? Yes   Refill activity plan Refill scheduled   Shipment/Pickup Date: 05/13/22          Current Outpatient Medications   Medication Sig    alirocumab (PRALUENT PEN) 75 mg/mL PnIj Inject 75 mg into the skin every 2 weeks    ascorbic acid (VITAMIN C) 1000 MG tablet Take 1,000 mg by mouth once daily.    aspirin 81 MG Chew Take 81 mg by mouth once daily.    azithromycin (Z-SONJA) 250 MG tablet Take 2 tablets (500 mg total) by mouth once daily.    b complex vitamins capsule Take 1 capsule by mouth once daily.    carvedilol (COREG) 6.25 MG tablet Take 6.25 mg by mouth 2 (two) times daily with meals.    citalopram (CELEXA) 20 MG tablet Take 20 mg by mouth once daily.    coenzyme Q10 (CO Q-10) 100 mg capsule Take 100 mg by mouth once daily.    evolocumab (REPATHA SURECLICK) 140 mg/mL PnIj Inject 140mg (1 pen) into the skin every 2 weeks    niacin (NIASPAN EXTENDED-RELEASE) 500 mg tablet Take 500 mg by mouth once daily.     quinapril (ACCUPRIL) 5 MG tablet Take 5 mg by mouth every evening.    ramelteon (ROZEREM) 8 mg tablet Take 8 mg by mouth once daily.    tizanidine (ZANAFLEX) 4 MG tablet Take 4 mg by mouth nightly.    vitamin D 1000 units Tab Take 185 mg by mouth once daily.   Last reviewed on 8/5/2015  9:10 AM by Jaime Cornell MD    Review of patient's allergies indicates:   Allergen Reactions    Ambien [zolpidem] Other (See Comments)    Sudafed [pseudoephedrine hcl] Other (See Comments)    Last reviewed on  11/15/2017 4:02 PM by Ramana Olguin      Tasks added this encounter   No tasks added.   Tasks due within next 3 months   5/10/2022 - Refill Call (Auto Added)     Brooklynn Price, PharmD  [facility]  [address]  [facility] [address]  Phone: [phone]  Fax: [phone]

## 2022-06-07 ENCOUNTER — SPECIALTY PHARMACY (OUTPATIENT)
Dept: PHARMACY | Facility: CLINIC | Age: 67
End: 2022-06-07

## 2022-06-07 ENCOUNTER — PATIENT MESSAGE (OUTPATIENT)
Dept: PHARMACY | Facility: CLINIC | Age: 67
End: 2022-06-07

## 2022-06-07 NOTE — TELEPHONE ENCOUNTER
Patient return call regarding Praluent refill. She state she had held dose from 5/31 and has 1 pen onhand PENDING message with cardiologist. She inform she's been having muscle pain that is consistent with previous therapy with statin and does not want to inject until they rule out praluent.

## 2022-07-22 NOTE — TELEPHONE ENCOUNTER
Outgoing call to patient regarding status of muscle pain with praluent and if she was able to get labs scheduled. No answer, lvm. Sent message to [facility] if they would consider switching her back to repatha.

## 2022-07-22 NOTE — TELEPHONE ENCOUNTER
Incoming call from patient on status of praluent and labwork. Patient completed labwork yesterday and expressed she will not be going back to praluent. Informed patient repatha request was sent to [facility]. Once we receive the prescription, [facility] will do workup and to get it approved by insurance. Patient verbalized understanding.

## 2022-09-23 NOTE — TELEPHONE ENCOUNTER
Refill call for Repatha. Verified to ship on Tuesday 6/18 for delivery on Wednesday 6/19. $5.00 copay at 004.     Prakash Butts, PharmD  Clinical Pharmacist  [facility]  P: [phone]     yes

## 2023-03-07 ENCOUNTER — SPECIALTY PHARMACY (OUTPATIENT)
Dept: PHARMACY | Facility: CLINIC | Age: 68
End: 2023-03-07

## 2023-03-08 NOTE — TELEPHONE ENCOUNTER
Repatha order received. PA required. Praluent is preferred drug on formulary. Patient had severe reaction to Praluent. Outside provider/referral form faxed to [phone].     Sudha, this is Ирина Maria T with [facility].  We are working on your prescription that your doctor has sent us. We will be working with your insurance to get this approved for you. We will be calling you along the way with updates on your medication.  If you have any questions, you can reach us at [phone].    Welcome call outcome: Patient/caregiver reached

## 2023-03-14 NOTE — TELEPHONE ENCOUNTER
Outside provider referral form and chart notes scanned to media. Repatha PA submitted via [facility] [id number]

## 2023-03-14 NOTE — TELEPHONE ENCOUNTER
Repatha PA approved.  Pt interested in FA.  Appears that Praluent erum on file covers Repatha also.  Forwarding to assigned [facility] to confirm and follow up.

## 2023-03-14 NOTE — TELEPHONE ENCOUNTER
BI: COMPLETE     MEDICARE PLAN: [facility]   Estimated copay: $331.27  Benefits Stage: Initial  In Network: yes  PA approval on file: 2/13/23 until further notice  LIS level: none

## 2023-03-14 NOTE — TELEPHONE ENCOUNTER
AARON RENEWAL- [facility] erum secured  [facility] ID [id number]  Dates: 3/29/23 to 3/28/24  Amount awarded $2500    BIN [id number]  LEONARDO PXXPDMI  ID [id number]  [facility] [id number]    Pending initial consult.

## 2023-03-15 ENCOUNTER — SPECIALTY PHARMACY (OUTPATIENT)
Dept: PHARMACY | Facility: CLINIC | Age: 68
End: 2023-03-15

## 2023-03-15 DIAGNOSIS — E78.5 HYPERLIPIDEMIA, UNSPECIFIED HYPERLIPIDEMIA TYPE: Primary | ICD-10-CM

## 2023-03-15 NOTE — TELEPHONE ENCOUNTER
Specialty Pharmacy - Initial Clinical Assessment    Specialty Medication Orders Linked to Encounter      Flowsheet Row Most Recent Value   Medication #1 evolocumab (REPATHA PUSHTRONEX) 420 mg/3.5 mL Injt (Order#[id number], Rx#[id number])          Patient Diagnosis   E78.5 - Hyperlipidemia, unspecified hyperlipidemia type    Subjective    Netta Lacy is a 67 y.o. female, who is followed by the specialty pharmacy service for management and education.    Recent Encounters       Date Type Provider Description    03/15/2023 Specialty Pharmacy Ida Francisco, BalwinderD Initial Clinical Assessment    03/07/2023 Specialty Pharmacy Casandra Saenz, BalwinderD Referral Authorization    06/07/2022 Specialty Pharmacy Tim Vang, [place] Clinical Intervention    05/10/2022 Specialty Pharmacy Brooklynn Price, PharmD Refill Coordination    04/12/2022 Specialty Pharmacy Marsha Jay, BalwinderD Refill Coordination          Clinical call attempts since last clinical assessment   No call attempts found.     Current Outpatient Medications   Medication Sig    ascorbic acid (VITAMIN C) 1000 MG tablet Take 1,000 mg by mouth once daily.    aspirin 81 MG Chew Take 81 mg by mouth once daily.    b complex vitamins capsule Take 1 capsule by mouth once daily.    carvediloL (COREG) 12.5 MG tablet Take 1 tablet orally 2 times a day.    citalopram (CELEXA) 20 MG tablet Take 20 mg by mouth once daily.    coenzyme Q10 (CO Q-10) 100 mg capsule Take 100 mg by mouth once daily.    evolocumab (REPATHA PUSHTRONEX) 420 mg/3.5 mL Injt Inject wearable injector (3.5 mL) into the skin once a month.    quinapriL (ACCUPRIL) 5 MG tablet Take 1 tablet orally once a day.    TURMERIC ORAL Take 500 mg by mouth once daily.    vitamin D 1000 units Tab Take 185 mg by mouth once daily.    niacin (NIASPAN EXTENDED-RELEASE) 500 mg tablet Take 500 mg by mouth once daily.    ramelteon (ROZEREM) 8 mg tablet Take 8 mg by mouth once daily.    tizanidine (ZANAFLEX) 4 MG tablet Take 4 mg  by mouth nightly.   Last reviewed on 3/15/2023  3:34 PM by Ida Francisco, PharmD    Review of patient's allergies indicates:   Allergen Reactions    Ambien [zolpidem] Other (See Comments)    Sudafed [pseudoephedrine hcl] Other (See Comments)   Last reviewed on  3/15/2023 3:34 PM by Ida Francisco    Drug Interactions    Drug interactions evaluated: yes  Clinically relevant drug interactions identified: no  Provided the patient with educational material regarding drug interactions: not applicable         Adverse Effects    *All other systems reviewed and are negative       Assessment Questions - Documented Responses      Flowsheet Row Most Recent Value   Assessment    Medication Reconciliation completed for patient Yes   During the past 4 weeks, has patient missed any activities due to condition or medication? No   During the past 4 weeks, did patient have any of the following urgent care visits? None   Goals of Therapy Status Discussed (new start)   Status of the patients ability to self-administer: Is Able   All education points have been covered with patient? No, patient declined- printed education provided   Welcome packet contents reviewed and discussed with patient? Yes   Assesment completed? Yes   Plan Therapy being initiated   Do you need to open a clinical intervention (i-vent)? No   Do you want to schedule first shipment? Yes   Medication #1 Assessment Info    Patient status New medication, Exisiting to [facility]   Is this medication appropriate for the patient? Yes   Is this medication effective? Not yet started          Refill Questions - Documented Responses      Flowsheet Row Most Recent Value   Patient Availability and HIPAA Verification    Does patient want to proceed with activity? Yes   HIPAA/medical authority confirmed? Yes   Relationship to patient of person spoken to? Self   Refill Screening Questions    When does the patient need to receive the medication? 03/17/23   Refill Delivery Questions    How  "will the patient receive the medication? MEDRx   When does the patient need to receive the medication? 03/17/23   Shipping Address Home   Address in [facility] confirmed and updated if neccessary? Yes   Expected Copay ($) 0   Is the patient able to afford the medication copay? Yes   Payment Method zero copay   Days supply of Refill 28   Supplies needed? Alcohol Swabs   Refill activity completed? Yes   Refill activity plan Refill scheduled   Shipment/Pickup Date: 03/16/23            Objective    She has a past medical history of CAD (coronary artery disease), Degenerative joint disease, High cholesterol, PTSD (post-traumatic stress disorder), and Scoliosis.    Tried/failed medications: Praluent, statins    BP Readings from Last 4 Encounters:   08/27/14 (!) 141/80     Ht Readings from Last 4 Encounters:   08/27/14 5' 4" (1.626 m)     Wt Readings from Last 4 Encounters:   08/27/14 85.5 kg (188 lb 6.4 oz)       The goals of prescribed drug therapy management include:  Supporting patient to meet the prescriber's medical treatment objectives  Improving or maintaining quality of life  Maintaining optimal therapy adherence  Minimizing and managing side effects      Goals of Therapy Status: Discussed (new start)    Assessment/Plan  Patient plans to start therapy on 03/17/23      Indication, dosage, appropriateness, effectiveness, safety and convenience of her specialty medication(s) were reviewed today.     Patient Education   Pharmacist offer to  patient was declined. Printed educational materials will be provided with medication.  Patient did accept verbal education on the following topics:  · Proper use, timely administration, and missed dose management  · New or changed medications, including prescribe and over the counter medications and supplements  · Storage, safe handling, and disposal    Patient declined injection training/consult as she is experienced to PCSK9 inhibitors - she has previously used " Repatha SureClick and Praluent. She is a former nurse and comfortable with administering injection. Reviewed [facility]'s services and refill process. Reviewed and updated medication list. She had no further questions or concerns.     Tasks added this encounter   4/7/2023 - Refill Call (Auto Added)   Tasks due within next 3 months   No tasks due.     Ida Francisco, PharmD  [facility]  [address] [facility] [address]  Phone: [phone]  Fax: [phone]

## 2023-03-24 NOTE — TELEPHONE ENCOUNTER
Outgoing call to Ms. Lacy. Reviewed that sharps containers are on national backer order. Alternatives reviewed. No other questions or concerns.

## 2023-04-10 ENCOUNTER — SPECIALTY PHARMACY (OUTPATIENT)
Dept: PHARMACY | Facility: CLINIC | Age: 68
End: 2023-04-10

## 2023-04-10 NOTE — TELEPHONE ENCOUNTER
Specialty Pharmacy - Refill Coordination    Specialty Medication Orders Linked to Encounter      Flowsheet Row Most Recent Value   Medication #1 evolocumab (REPATHA PUSHTRONEX) 420 mg/3.5 mL Injt (Order#[id number], Rx#[id number])            Refill Questions - Documented Responses      Flowsheet Row Most Recent Value   Patient Availability and HIPAA Verification    Does patient want to proceed with activity? Yes   HIPAA/medical authority confirmed? Yes   Relationship to patient of person spoken to? Self   Refill Screening Questions    Would patient like to speak to a pharmacist? No   When does the patient need to receive the medication? 04/17/23   Refill Delivery Questions    When does the patient need to receive the medication? 04/17/23   Shipping Address Home   Address in [facility] confirmed and updated if neccessary? Yes   Expected Copay ($) 0   Is the patient able to afford the medication copay? Yes   Payment Method zero copay   Days supply of Refill 28   Supplies needed? No supplies needed   Refill activity completed? Yes   Refill activity plan Refill scheduled   Shipment/Pickup Date: 04/12/23            Current Outpatient Medications   Medication Sig    ascorbic acid (VITAMIN C) 1000 MG tablet Take 1,000 mg by mouth once daily.    aspirin 81 MG Chew Take 81 mg by mouth once daily.    b complex vitamins capsule Take 1 capsule by mouth once daily.    carvediloL (COREG) 12.5 MG tablet Take 1 tablet orally 2 times a day.    citalopram (CELEXA) 20 MG tablet Take 20 mg by mouth once daily.    coenzyme Q10 (CO Q-10) 100 mg capsule Take 100 mg by mouth once daily.    evolocumab (REPATHA PUSHTRONEX) 420 mg/3.5 mL Injt Inject wearable injector (3.5 mL) into the skin once a month.    niacin (NIASPAN EXTENDED-RELEASE) 500 mg tablet Take 500 mg by mouth once daily.    quinapriL (ACCUPRIL) 5 MG tablet Take 1 tablet orally once a day.    ramelteon (ROZEREM) 8 mg tablet Take 8 mg by mouth once daily.    tizanidine  (ZANAFLEX) 4 MG tablet Take 4 mg by mouth nightly.    TURMERIC ORAL Take 500 mg by mouth once daily.    vitamin D 1000 units Tab Take 185 mg by mouth once daily.   Last reviewed on 3/15/2023  3:34 PM by Ida Francisco, [place]    Review of patient's allergies indicates:   Allergen Reactions    Ambien [zolpidem] Other (See Comments)    Sudafed [pseudoephedrine hcl] Other (See Comments)    Last reviewed on  3/15/2023 3:34 PM by Ida Francisco      Tasks added this encounter   No tasks added.   Tasks due within next 3 months   4/7/2023 - Refill Call (Auto Added)     ROME GRIMM, PharmD  [facility]  [address] [place] [address]  Phone: [phone]  Fax: [phone]

## 2023-05-09 ENCOUNTER — SPECIALTY PHARMACY (OUTPATIENT)
Dept: PHARMACY | Facility: CLINIC | Age: 68
End: 2023-05-09

## 2023-05-09 NOTE — TELEPHONE ENCOUNTER
Specialty Pharmacy - Refill Coordination    Specialty Medication Orders Linked to Encounter      Flowsheet Row Most Recent Value   Medication #1 evolocumab (REPATHA PUSHTRONEX) 420 mg/3.5 mL Injt (Order#[id number], Rx#[id number])            Refill Questions - Documented Responses      Flowsheet Row Most Recent Value   Refill Screening Questions    Would patient like to speak to a pharmacist? No   When does the patient need to receive the medication? 05/17/23   Refill Delivery Questions    How will the patient receive the medication? MEDRx   When does the patient need to receive the medication? 05/17/23   Shipping Address Home   Address in [facility] confirmed and updated if neccessary? Yes   Expected Copay ($) 0   Is the patient able to afford the medication copay? Yes   Payment Method zero copay   Days supply of Refill 28   Supplies needed? No supplies needed   Refill activity completed? Yes   Refill activity plan Refill scheduled   Shipment/Pickup Date: 05/15/23            Current Outpatient Medications   Medication Sig    ascorbic acid (VITAMIN C) 1000 MG tablet Take 1,000 mg by mouth once daily.    aspirin 81 MG Chew Take 81 mg by mouth once daily.    b complex vitamins capsule Take 1 capsule by mouth once daily.    carvediloL (COREG) 12.5 MG tablet Take 1 tablet orally 2 times a day.    citalopram (CELEXA) 20 MG tablet Take 20 mg by mouth once daily.    coenzyme Q10 (CO Q-10) 100 mg capsule Take 100 mg by mouth once daily.    evolocumab (REPATHA PUSHTRONEX) 420 mg/3.5 mL Injt Inject wearable injector (3.5 mL) into the skin once a month.    niacin (NIASPAN EXTENDED-RELEASE) 500 mg tablet Take 500 mg by mouth once daily.    quinapriL (ACCUPRIL) 5 MG tablet Take 1 tablet orally once a day.    ramelteon (ROZEREM) 8 mg tablet Take 8 mg by mouth once daily.    tizanidine (ZANAFLEX) 4 MG tablet Take 4 mg by mouth nightly.    TURMERIC ORAL Take 500 mg by mouth once daily.    vitamin D 1000 units Tab Take 185 mg  by mouth once daily.   Last reviewed on 3/15/2023  3:34 PM by Ida Francisco, PharmD    Review of patient's allergies indicates:   Allergen Reactions    Ambien [zolpidem] Other (See Comments)    Sudafed [pseudoephedrine hcl] Other (See Comments)    Last reviewed on  3/15/2023 3:34 PM by Ida Francisco      Tasks added this encounter   No tasks added.   Tasks due within next 3 months   5/8/2023 - Refill Coordination Outreach (1 time occurrence)     Rina Neves [facility]  [address] [facility] [address]  Phone: [phone]  Fax: [phone]

## 2023-06-06 ENCOUNTER — SPECIALTY PHARMACY (OUTPATIENT)
Dept: PHARMACY | Facility: CLINIC | Age: 68
End: 2023-06-06

## 2023-06-06 NOTE — TELEPHONE ENCOUNTER
Specialty Pharmacy - Refill Coordination    Specialty Medication Orders Linked to Encounter      Flowsheet Row Most Recent Value   Medication #1 evolocumab (REPATHA PUSHTRONEX) 420 mg/3.5 mL Injt (Order#[id number], Rx#[id number])            Refill Questions - Documented Responses      Flowsheet Row Most Recent Value   Patient Availability and HIPAA Verification    Does patient want to proceed with activity? Yes   HIPAA/medical authority confirmed? Yes   Relationship to patient of person spoken to? Self            Current Outpatient Medications   Medication Sig    ascorbic acid (VITAMIN C) 1000 MG tablet Take 1,000 mg by mouth once daily.    aspirin 81 MG Chew Take 81 mg by mouth once daily.    b complex vitamins capsule Take 1 capsule by mouth once daily.    carvediloL (COREG) 12.5 MG tablet Take 1 tablet orally 2 times a day.    citalopram (CELEXA) 20 MG tablet Take 20 mg by mouth once daily.    coenzyme Q10 (CO Q-10) 100 mg capsule Take 100 mg by mouth once daily.    evolocumab (REPATHA PUSHTRONEX) 420 mg/3.5 mL Injt Inject wearable injector (3.5 mL) into the skin once a month.    niacin (NIASPAN EXTENDED-RELEASE) 500 mg tablet Take 500 mg by mouth once daily.    quinapriL (ACCUPRIL) 5 MG tablet Take 1 tablet orally once a day.    ramelteon (ROZEREM) 8 mg tablet Take 8 mg by mouth once daily.    tizanidine (ZANAFLEX) 4 MG tablet Take 4 mg by mouth nightly.    TURMERIC ORAL Take 500 mg by mouth once daily.    vitamin D 1000 units Tab Take 185 mg by mouth once daily.   Last reviewed on 3/15/2023  3:34 PM by Ida Francisco, PharmD    Review of patient's allergies indicates:   Allergen Reactions    Ambien [zolpidem] Other (See Comments)    Sudafed [pseudoephedrine hcl] Other (See Comments)    Last reviewed on  3/15/2023 3:34 PM by Ida Francisco      Tasks added this encounter   No tasks added.   Tasks due within next 3 months   6/5/2023 - Refill Coordination Outreach (1 time occurrence)     [facility] -  Specialty Pharmacy  [facility] [facility] [address]  Phone: [phone]  Fax: [phone]

## 2023-07-03 ENCOUNTER — SPECIALTY PHARMACY (OUTPATIENT)
Dept: PHARMACY | Facility: CLINIC | Age: 68
End: 2023-07-03

## 2023-07-03 DIAGNOSIS — E78.5 HYPERLIPIDEMIA, UNSPECIFIED HYPERLIPIDEMIA TYPE: Primary | ICD-10-CM

## 2023-07-03 NOTE — TELEPHONE ENCOUNTER
Outgoing call to pt regarding repatha refill. Next injection 7/17. Will follow up 7/10 for refill.

## 2023-07-10 NOTE — TELEPHONE ENCOUNTER
Specialty Pharmacy - Refill Coordination    Specialty Medication Orders Linked to Encounter      Flowsheet Row Most Recent Value   Medication #1 evolocumab (REPATHA PUSHTRONEX) 420 mg/3.5 mL Injt (Order#[id number], Rx#[id number])            Refill Questions - Documented Responses      Flowsheet Row Most Recent Value   Patient Availability and HIPAA Verification    Does patient want to proceed with activity? Yes   HIPAA/medical authority confirmed? Yes   Relationship to patient of person spoken to? Self   Refill Screening Questions    Would patient like to speak to a pharmacist? No   When does the patient need to receive the medication? 07/17/23   Refill Delivery Questions    How will the patient receive the medication? MEDRx   When does the patient need to receive the medication? 07/17/23   Shipping Address Home   Address in [facility] confirmed and updated if neccessary? Yes   Expected Copay ($) 0   Is the patient able to afford the medication copay? Yes   Payment Method zero copay   Days supply of Refill 30   Supplies needed? No supplies needed   Refill activity completed? Yes   Refill activity plan Refill scheduled   Shipment/Pickup Date: 07/13/23            Current Outpatient Medications   Medication Sig    ascorbic acid (VITAMIN C) 1000 MG tablet Take 1,000 mg by mouth once daily.    aspirin 81 MG Chew Take 81 mg by mouth once daily.    b complex vitamins capsule Take 1 capsule by mouth once daily.    carvediloL (COREG) 12.5 MG tablet Take 1 tablet orally 2 times a day.    citalopram (CELEXA) 20 MG tablet Take 20 mg by mouth once daily.    coenzyme Q10 (CO Q-10) 100 mg capsule Take 100 mg by mouth once daily.    evolocumab (REPATHA PUSHTRONEX) 420 mg/3.5 mL Injt Inject wearable injector (3.5 mL) into the skin once a month.    niacin (NIASPAN EXTENDED-RELEASE) 500 mg tablet Take 500 mg by mouth once daily.    quinapriL (ACCUPRIL) 5 MG tablet Take 1 tablet orally once a day.    ramelteon (ROZEREM) 8 mg  tablet Take 8 mg by mouth once daily.    tizanidine (ZANAFLEX) 4 MG tablet Take 4 mg by mouth nightly.    TURMERIC ORAL Take 500 mg by mouth once daily.    vitamin D 1000 units Tab Take 185 mg by mouth once daily.   Last reviewed on 3/15/2023  3:34 PM by Ida Francisco, [place]    Review of patient's allergies indicates:   Allergen Reactions    Ambien [zolpidem] Other (See Comments)    Sudafed [pseudoephedrine hcl] Other (See Comments)    Last reviewed on  3/15/2023 3:34 PM by Ida Francisco      Tasks added this encounter   No tasks added.   Tasks due within next 3 months   7/13/2023 - Refill Coordination Outreach (1 time occurrence)     Van Hendrickson, PharmD  [facility]  [address] [place] [id number]  Phone: [phone]  Fax: [phone]

## 2023-08-11 ENCOUNTER — SPECIALTY PHARMACY (OUTPATIENT)
Dept: PHARMACY | Facility: CLINIC | Age: 68
End: 2023-08-11

## 2023-08-11 NOTE — TELEPHONE ENCOUNTER
Specialty Pharmacy - Refill Coordination    Specialty Medication Orders Linked to Encounter      Flowsheet Row Most Recent Value   Medication #1 evolocumab (REPATHA PUSHTRONEX) 420 mg/3.5 mL Injt (Order#[id number], Rx#[id number])            Refill Questions - Documented Responses      Flowsheet Row Most Recent Value   Patient Availability and HIPAA Verification    Does patient want to proceed with activity? Yes   HIPAA/medical authority confirmed? Yes   Relationship to patient of person spoken to? Self   Refill Screening Questions    Would patient like to speak to a pharmacist? No   When does the patient need to receive the medication? 08/17/23   Refill Delivery Questions    How will the patient receive the medication? MEDRx   When does the patient need to receive the medication? 08/17/23   Shipping Address Home   Address in [facility] confirmed and updated if neccessary? Yes   Expected Copay ($) 0   Is the patient able to afford the medication copay? Yes   Payment Method zero copay   Days supply of Refill 28   Supplies needed? No supplies needed   Refill activity completed? Yes   Refill activity plan Refill scheduled   Shipment/Pickup Date: 08/14/23            Current Outpatient Medications   Medication Sig    ascorbic acid (VITAMIN C) 1000 MG tablet Take 1,000 mg by mouth once daily.    aspirin 81 MG Chew Take 81 mg by mouth once daily.    b complex vitamins capsule Take 1 capsule by mouth once daily.    carvediloL (COREG) 12.5 MG tablet Take 1 tablet orally 2 times a day.    citalopram (CELEXA) 20 MG tablet Take 20 mg by mouth once daily.    coenzyme Q10 (CO Q-10) 100 mg capsule Take 100 mg by mouth once daily.    evolocumab (REPATHA PUSHTRONEX) 420 mg/3.5 mL Injt Inject wearable injector (3.5 mL) into the skin once a month.    niacin (NIASPAN EXTENDED-RELEASE) 500 mg tablet Take 500 mg by mouth once daily.    quinapriL (ACCUPRIL) 5 MG tablet Take 1 tablet orally once a day.    ramelteon (ROZEREM) 8 mg  tablet Take 8 mg by mouth once daily.    tizanidine (ZANAFLEX) 4 MG tablet Take 4 mg by mouth nightly.    TURMERIC ORAL Take 500 mg by mouth once daily.    vitamin D 1000 units Tab Take 185 mg by mouth once daily.   Last reviewed on 3/15/2023  3:34 PM by Ida Francisco, PharmD    Review of patient's allergies indicates:   Allergen Reactions    Ambien [zolpidem] Other (See Comments)    Sudafed [pseudoephedrine hcl] Other (See Comments)    Last reviewed on  3/15/2023 3:34 PM by Ida Francisco      Tasks added this encounter   No tasks added.   Tasks due within next 3 months   8/14/2023 - Refill Coordination Outreach (1 time occurrence)     [facility]  [address] [place] [id number]  Phone: [phone]  Fax: [phone]